# Patient Record
Sex: MALE | Race: AMERICAN INDIAN OR ALASKA NATIVE
[De-identification: names, ages, dates, MRNs, and addresses within clinical notes are randomized per-mention and may not be internally consistent; named-entity substitution may affect disease eponyms.]

---

## 2018-10-01 ENCOUNTER — HOSPITAL ENCOUNTER (EMERGENCY)
Dept: HOSPITAL 42 - ED | Age: 38
Discharge: HOME | End: 2018-10-01
Payer: SELF-PAY

## 2018-10-01 VITALS
SYSTOLIC BLOOD PRESSURE: 136 MMHG | RESPIRATION RATE: 18 BRPM | HEART RATE: 83 BPM | OXYGEN SATURATION: 100 % | DIASTOLIC BLOOD PRESSURE: 79 MMHG

## 2018-10-01 VITALS — TEMPERATURE: 97.9 F

## 2018-10-01 VITALS — BODY MASS INDEX: 29 KG/M2
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
## 2018-10-01 DIAGNOSIS — M25.561: ICD-10-CM

## 2018-10-01 DIAGNOSIS — S89.91XA: Primary | ICD-10-CM
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
## 2018-10-01 NOTE — US
PROCEDURE:  Right lower extremity venous US 



HISTORY:

Leg pain and swelling. Evaluate for DVT.



PHYSICIAN(S):  Mp Fong M.D.



TECHNIQUE:

Duplex sonography and color-flow Doppler with graded compression were 

used to evaluate the deep venous system of the right lower extremity. 



FINDINGS:

The visualized deep venous system of the right lower extremity is 

sonographically normal and compressible. Normal waveforms and 

augmentation are seen. There is no sonographic evidence for deep 

venous thrombosis in the visualized segments of the right lower 

extremity.



IMPRESSION:

1. No sonographic evidence for deep venous thrombosis in the 

visualized segments of the right lower extremity.

## 2018-10-01 NOTE — RAD
Date of service: 



10/01/2018



PROCEDURE:  Right Knee Radiographs.



HISTORY:

rt. knee injury, pain on anterior medial



COMPARISON:

None.



FINDINGS:



BONES:

Normal. No fracture. 



JOINTS:

Normal. No osteoarthritis. 



JOINT EFFUSION:

None. 



OTHER FINDINGS:

None.



IMPRESSION:

Normal radiographs of the right knee.

## 2018-10-01 NOTE — ED PDOC
Arrival/HPI





- General


Chief Complaint: Lower Extremity Problem/Injury


Time Seen by Provider: 10/01/18 09:55


Historian: Patient





- History of Present Illness


Narrative History of Present Illness (Text): 





10/01/18 09:55


37 y/o male, no pmh, nkda, c/o rt. knee injury and pain x 2 days.  Pt. stated 

that he was playing basketball, jump and twisted which he heard "popping" sound 

but he is able to walk and stand, stated that it started to hurt him today, no 

ankle or thigh pain, no foot pain, able to bear weight and walk, no numbness or 

tingling, no calf pain, no other medical or psychological complaints. 





Past Medical History





- Provider Review


Nursing Documentation Reviewed: Yes





- Musculoskeletal/Rheumatological


Hx Musculoskeletal Disorders: Yes


Hx Back Pain: Yes





- Psychiatric


Hx Substance Use: Yes





Family/Social History





- Physician Review


Nursing Documentation Reviewed: Yes


Family/Social History: Unknown Family HX


Smoking Status: Light Smoker < 10 Cigarettes Daily


Hx Alcohol Use: No


Hx Substance Use: Yes


Substance used: marijuana





Allergies/Home Meds


Allergies/Adverse Reactions: 


Allergies





No Known Allergies Allergy (Verified 10/01/18 09:39)


   











Review of Systems





- Review of Systems


Constitutional: absent: Fatigue, Fevers


Eyes: absent: Vision Changes


ENT: absent: Hearing Changes


Respiratory: absent: SOB, Cough


Cardiovascular: absent: Chest Pain


Gastrointestinal: absent: Abdominal Pain, Nausea, Vomiting


Genitourinary Male: absent: Dysuria, Frequency


Musculoskeletal: Arthralgias.  absent: Back Pain, Neck Pain, Joint Swelling, 

Myalgias


Skin: absent: Rash, Pruritis


Hemo/Lymphatic: absent: Adenopathy


Psychiatric: absent: Anxiety, Depression, Suicidal Ideation





Physical Exam


Vital Signs Reviewed: Yes





Vital Signs











  Temp Pulse Resp BP Pulse Ox


 


 10/01/18 09:39  97.9 F  61  17  154/61 H  99











Temperature: Afebrile


Blood Pressure: Hypertensive


Pulse: Regular


Respiratory Rate: Normal


Appearance: Positive for: Well-Appearing, Non-Toxic, Comfortable


Pain Distress: Mild


Mental Status: Positive for: Alert and Oriented X 3





- Systems Exam


Head: Present: Atraumatic, Normocephalic


Pupils: Present: PERRL


Extroacular Muscles: Present: EOMI


Conjunctiva: Present: Normal


Mouth: Present: Moist Mucous Membranes


Neck: Present: Normal Range of Motion


Respiratory/Chest: Present: Clear to Auscultation, Good Air Exchange.  No: 

Respiratory Distress, Accessory Muscle Use


Cardiovascular: Present: Regular Rate and Rhythm, Normal S1, S2.  No: Murmurs


Abdomen: No: Tenderness, Distention, Peritoneal Signs, Rebound, Guarding


Back: Present: Normal Inspection


Upper Extremity: Present: Normal Inspection.  No: Cyanosis, Edema


Lower Extremity: Present: Normal Inspection, Other (RLE: +ttp on the anterior 

medial aspect on the knee with very mild swelling, negative ann and conley 

signs, FROM without limitation, sensation intact, motor 5/5, +DPPT pulses, 

capillary refill< 2 seconds, neurovascular intact, no skin 

erythematous/cellulitis).  No: Edema


Neurological: Present: GCS=15, CN II-XII Intact, Speech Normal


Skin: Present: Warm, Dry, Normal Color.  No: Rashes


Psychiatric: Present: Alert, Oriented x 3, Normal Insight, Normal Concentration





Medical Decision Making


ED Course and Treatment: 





10/01/18 10:10


-Rt. knee xray


-RLE Venuous doppler


-Pt. refused pain med


-observe and reassess





10/01/18 11:21


-RLE Venuous doppler: No sonographic evidence for deep venous thrombosis in the 

visualized segments of the right lower extremity.


-Rt. knee xray: Normal radiographs of the right knee.


-All labs/radiology results discussed with the patient, advised outpatient MRI 

of the rt. knee if pain persist over 7 days.


-Discharge home with naproxen, ace wrap, crutches, ice compression, non-weight 

bearing, follow up with your own pmd and orthopedic within 2 days, out patient 

MRI of the rt. knee if pain persist, return to the ER for any new or worsening 

signs or symptoms. 








- RAD Interpretation


Radiology Orders: 








RLE Venuous Doppler: 





PROCEDURE:  Right lower extremity venous US 





HISTORY:


Leg pain and swelling. Evaluate for DVT.





PHYSICIAN(S):  Mp Fong M.D.





TECHNIQUE:


Duplex sonography and color-flow Doppler with graded compression were used to 

evaluate the deep venous system of the right lower extremity. 





FINDINGS:


The visualized deep venous system of the right lower extremity is 

sonographically normal and compressible. Normal waveforms and augmentation are 

seen. There is no sonographic evidence for deep venous thrombosis in the 

visualized segments of the right lower extremity.





IMPRESSION:


1. No sonographic evidence for deep venous thrombosis in the visualized segments

of the right lower extremity.


 

--------------------------------------------------------------------------------


----------------------------------------------------------------


Rt. knee xray: 





Date of service: 





10/01/2018





PROCEDURE:  Right Knee Radiographs.





HISTORY:


rt. knee injury, pain on anterior medial





COMPARISON:


None.





FINDINGS:





BONES:


Normal. No fracture. 





JOINTS:


Normal. No osteoarthritis. 





JOINT EFFUSION:


None. 





OTHER FINDINGS:


None.





IMPRESSION:


Normal radiographs of the right knee.





: Radiologist





- PA / NP / Resident Statement


MD/ has reviewed & agrees with the documentation as recorded.





Disposition/Present on Arrival





- Present on Arrival


Any Indicators Present on Arrival: No


History of DVT/PE: No


History of Uncontrolled Diabetes: No


Urinary Catheter: No


History of Decub. Ulcer: No


History Surgical Site Infection Following: None





- Disposition


Have Diagnosis and Disposition been Completed?: Yes


Diagnosis: 


 Knee injury, Knee pain





Disposition: HOME/ ROUTINE


Disposition Time: 11:24


Patient Plan: Discharge


Condition: GOOD


Additional Instructions: 


-Discharge home with naproxen, ace wrap, crutches, ice compression, non-weight 

bearing, follow up with your own pmd and orthopedic within 2 days, out patient 

MRI of the rt. knee if pain persist, return to the ER for any new or worsening 

signs or symptoms. 


Prescriptions: 


Naproxen 500 mg PO BID PRN #20 tablet


 PRN Reason: Other


Referrals: 


PCP,NO [Primary Care Provider] - Follow up with primary


Libia Giraldo MD [Staff Provider] - Follow up with primary


[facility] [place] [Outside] - Follow up with primary


Forms:  [employer] (English), WORK NOTE